# Patient Record
Sex: FEMALE | Race: WHITE | NOT HISPANIC OR LATINO | Employment: FULL TIME | URBAN - METROPOLITAN AREA
[De-identification: names, ages, dates, MRNs, and addresses within clinical notes are randomized per-mention and may not be internally consistent; named-entity substitution may affect disease eponyms.]

---

## 2017-11-15 PROBLEM — C50.912 MALIGNANT NEOPLASM OF LEFT FEMALE BREAST: Status: ACTIVE | Noted: 2017-11-15

## 2018-09-18 PROBLEM — G63 NEUROPATHY ASSOCIATED WITH MALIGNANT NEOPLASM: Status: ACTIVE | Noted: 2018-09-18

## 2018-09-18 PROBLEM — C80.1 NEUROPATHY ASSOCIATED WITH MALIGNANT NEOPLASM: Status: ACTIVE | Noted: 2018-09-18

## 2019-04-24 ENCOUNTER — OFFICE VISIT (OUTPATIENT)
Dept: URGENT CARE | Facility: CLINIC | Age: 54
End: 2019-04-24
Payer: MEDICAID

## 2019-04-24 VITALS
DIASTOLIC BLOOD PRESSURE: 91 MMHG | HEART RATE: 82 BPM | TEMPERATURE: 99 F | BODY MASS INDEX: 29.62 KG/M2 | WEIGHT: 200 LBS | HEIGHT: 69 IN | OXYGEN SATURATION: 100 % | SYSTOLIC BLOOD PRESSURE: 163 MMHG

## 2019-04-24 DIAGNOSIS — I80.01 SUPERFICIAL PHLEBITIS OF RIGHT LEG: Primary | ICD-10-CM

## 2019-04-24 PROCEDURE — 99213 PR OFFICE/OUTPT VISIT, EST, LEVL III, 20-29 MIN: ICD-10-PCS | Mod: S$GLB,,, | Performed by: FAMILY MEDICINE

## 2019-04-24 PROCEDURE — 99213 OFFICE O/P EST LOW 20 MIN: CPT | Mod: S$GLB,,, | Performed by: FAMILY MEDICINE

## 2019-04-24 NOTE — PATIENT INSTRUCTIONS
Superficial Thrombophlebitis   The superficial veins are the veins near the surface of the skin. Superficial thrombophlebitis is a problem that occurs when one or more of these veins become inflamed (red, irritated, and swollen). This is most often because of a blood clot.  Causes  The problem may occur after injury to a vein. It may also occur after having an intravenous (IV) line placed. Other factors that can make the problem more likely include:  · Varicose veins  · Venous insufficiency  · Bleeding disorders  · Prolonged periods of rest and not moving around  · IV drug abuse  Symptoms  Symptoms may appear in the affected area. They can include:  · Pain  · Tenderness  · Redness  · Warmth  · Swelling  · Hardening of the vein  In most cases, superficial thrombophlebitis resolves on its own with no problems. Treatment is focused on relieving symptoms.  Sometimes, there is a risk that the deep veins in the body may also be involved. This can lead to more serious problems. In such cases, further testing and treatments may be needed. Your healthcare provider can tell you more about this.  Home Care  To help relieve pain and swelling, you may be told to:  · Apply heat or cold to the affected area. Do this for up to 10 minutes as often as directed.  ¨ Heat: Use a warm compress, such as a heating pad.  ¨ Cold: Use a cold compress, such as a cold pack or bag of ice wrapped in a thin towel.  · Take nonsteroidal anti-inflammatory drugs (NSAIDS), such as ibuprofen. In some cases, other pain medicines may be prescribed.  · Keep the affected limb (arm or leg) raised above heart level as directed.  · Wear elastic compression stockings or bandages as directed.  · Avoid prolonged sitting or standing. Get up and walk often.  To help treat a blood clot, a blood thinner (anticoagulant) may be prescribed. If this is needed, be sure to take the medicine exactly as directed.  Follow-up care  Follow up with your healthcare provider as  advised. If imaging tests are done, they will be reviewed by a doctor. Youll be told the results and any new findings that may affect your treatment.  When to seek medical advice  Call your healthcare provider right away if any of these occur:  · Fever of 100.4°F (38ºC) or higher, or as directed by your provider  · Increasing pain, swelling, or tenderness in the affected area  · Spreading warmth or redness in the affected area  Call 911  Call 911 right away if any of these occur:  · Trouble breathing  · Chest pain or discomfort that worsens with deep breathing or coughing  · Coughing (may cough up blood)  · Fast or irregular heartbeat  · Sweating  · Anxiety  · Lightheadedness, dizziness, or fainting  · Extreme confusion  · Extreme drowsiness or trouble waking up  · New pain in the chest, arm, shoulder, neck, or upper back  Date Last Reviewed: 9/21/2015  © 6400-0746 [employer]. [address] [place] [address], [place] [address]. All rights reserved. This information is not intended as a substitute for professional medical care. Always follow your healthcare professional's instructions.

## 2019-04-24 NOTE — PROGRESS NOTES
"Subjective:       Patient ID: Krystyna Miller is a 54 y.o. female.    Vitals:  height is 5' 9" (1.753 m) and weight is 90.7 kg (200 lb). Her oral temperature is 99 °F (37.2 °C). Her blood pressure is 163/91 (abnormal) and her pulse is 82. Her oxygen saturation is 100%.     Chief Complaint: Leg Swelling    Krystyna hit her right leg on one of the machines at her gym last friday. Since then its tender to the touch and has been very red.    Leg Pain    The incident occurred more than 1 week ago. The incident occurred at the gym. The injury mechanism was a direct blow. The pain is present in the right leg. The pain is at a severity of 2/10. The pain is mild. It is unknown if a foreign body is present. Nothing aggravates the symptoms. She has tried acetaminophen for the symptoms. The treatment provided mild relief.       Constitution: Negative for fatigue.   HENT: Negative for facial swelling and facial trauma.    Neck: Negative for neck stiffness.   Cardiovascular: Negative for chest trauma.   Eyes: Negative for eye trauma, double vision and blurred vision.   Gastrointestinal: Negative for abdominal trauma, abdominal pain and rectal bleeding.   Genitourinary: Negative for hematuria, missed menses, genital trauma and pelvic pain.   Musculoskeletal: Positive for pain and joint swelling. Negative for trauma and abnormal ROM of joint.   Skin: Negative for color change, wound, abrasion, laceration and bruising.   Neurological: Negative for dizziness, history of vertigo, light-headedness, coordination disturbances, altered mental status and loss of consciousness.   Hematologic/Lymphatic: Negative for history of bleeding disorder.   Psychiatric/Behavioral: Negative for altered mental status.       Objective:      Physical Exam   Constitutional: She is oriented to person, place, and time. She appears well-developed and well-nourished. She is cooperative.  Non-toxic appearance. She does not appear ill. No distress.   HENT:   Head: " Normocephalic and atraumatic.   Right Ear: Hearing, tympanic membrane, external ear and ear canal normal.   Left Ear: Hearing, tympanic membrane, external ear and ear canal normal.   Nose: Nose normal. No mucosal edema, rhinorrhea or nasal deformity. No epistaxis. Right sinus exhibits no maxillary sinus tenderness and no frontal sinus tenderness. Left sinus exhibits no maxillary sinus tenderness and no frontal sinus tenderness.   Mouth/Throat: Uvula is midline, oropharynx is clear and moist and mucous membranes are normal. No trismus in the jaw. Normal dentition. No uvula swelling. No posterior oropharyngeal erythema.   Eyes: Conjunctivae and lids are normal. Right eye exhibits no discharge. Left eye exhibits no discharge. No scleral icterus.   Sclera clear bilat   Neck: Trachea normal, normal range of motion, full passive range of motion without pain and phonation normal. Neck supple.   Cardiovascular: Normal rate, regular rhythm, normal heart sounds, intact distal pulses and normal pulses.   Pulmonary/Chest: Effort normal and breath sounds normal. No respiratory distress.   Abdominal: Soft. Normal appearance and bowel sounds are normal. She exhibits no distension, no pulsatile midline mass and no mass. There is no tenderness.   Musculoskeletal: Normal range of motion. She exhibits no edema or deformity.   Neurological: She is alert and oriented to person, place, and time. She exhibits normal muscle tone. Coordination normal.   Skin: Skin is warm, dry and intact. She is not diaphoretic. No pallor.   Varicosities of the anterior shins.  Just above the right ankle is an area of super facial thrombophlebitis with mild surrounding erythema.    This is lesion patient is referring to there is no evidence of secondary infection or fluctuance.   Psychiatric: She has a normal mood and affect. Her speech is normal and behavior is normal. Judgment and thought content normal. Cognition and memory are normal.   Nursing note and  vitals reviewed.      Assessment:       1. Superficial phlebitis of right leg        Plan:         Superficial phlebitis of right leg     Elevation moist heat.  No indication for antibiotics or I and D at this time.  Literature provided.  Follow-up if no improvement a 3-5 days.

## 2019-04-27 ENCOUNTER — TELEPHONE (OUTPATIENT)
Dept: URGENT CARE | Facility: CLINIC | Age: 54
End: 2019-04-27

## 2020-04-22 DIAGNOSIS — M25.561 ACUTE PAIN OF RIGHT KNEE: Primary | ICD-10-CM

## 2020-04-27 ENCOUNTER — OFFICE VISIT (OUTPATIENT)
Dept: ORTHOPEDICS | Facility: CLINIC | Age: 55
End: 2020-04-27
Payer: MEDICAID

## 2020-04-27 ENCOUNTER — HOSPITAL ENCOUNTER (OUTPATIENT)
Dept: RADIOLOGY | Facility: HOSPITAL | Age: 55
Discharge: HOME | End: 2020-04-27
Attending: ORTHOPAEDIC SURGERY
Payer: MEDICAID

## 2020-04-27 VITALS — TEMPERATURE: 98 F | WEIGHT: 220 LBS | BODY MASS INDEX: 32.58 KG/M2 | HEIGHT: 69 IN

## 2020-04-27 DIAGNOSIS — M25.561 BILATERAL KNEE PAIN: ICD-10-CM

## 2020-04-27 DIAGNOSIS — M17.11 PRIMARY OSTEOARTHRITIS OF RIGHT KNEE: Primary | ICD-10-CM

## 2020-04-27 DIAGNOSIS — M25.561 ACUTE PAIN OF RIGHT KNEE: ICD-10-CM

## 2020-04-27 DIAGNOSIS — M25.562 BILATERAL KNEE PAIN: ICD-10-CM

## 2020-04-27 PROCEDURE — 99999 PR PBB SHADOW E&M-EST. PATIENT-LVL III: ICD-10-PCS | Mod: PBBFAC,,, | Performed by: ORTHOPAEDIC SURGERY

## 2020-04-27 PROCEDURE — 99999 PR PBB SHADOW E&M-EST. PATIENT-LVL III: CPT | Mod: PBBFAC,,, | Performed by: ORTHOPAEDIC SURGERY

## 2020-04-27 PROCEDURE — 73562 X-RAY EXAM OF KNEE 3: CPT | Mod: 26,50,, | Performed by: RADIOLOGY

## 2020-04-27 PROCEDURE — 99203 OFFICE O/P NEW LOW 30 MIN: CPT | Mod: 25,S$PBB,, | Performed by: ORTHOPAEDIC SURGERY

## 2020-04-27 PROCEDURE — 99213 OFFICE O/P EST LOW 20 MIN: CPT | Mod: PBBFAC,25,PN | Performed by: ORTHOPAEDIC SURGERY

## 2020-04-27 PROCEDURE — 97760 ORTHOTIC MGMT&TRAING 1ST ENC: CPT | Mod: GP,,, | Performed by: ORTHOPAEDIC SURGERY

## 2020-04-27 PROCEDURE — 73562 XR KNEE ORTHO BILAT: ICD-10-PCS | Mod: 26,50,, | Performed by: RADIOLOGY

## 2020-04-27 PROCEDURE — 99203 PR OFFICE/OUTPT VISIT, NEW, LEVL III, 30-44 MIN: ICD-10-PCS | Mod: 25,S$PBB,, | Performed by: ORTHOPAEDIC SURGERY

## 2020-04-27 PROCEDURE — 97760 PR ORTHOTIC MGMT&TRAINJ INITIAL ENC EA 15 MINS: ICD-10-PCS | Mod: GP,,, | Performed by: ORTHOPAEDIC SURGERY

## 2020-04-27 PROCEDURE — 73562 X-RAY EXAM OF KNEE 3: CPT | Mod: TC,50,PO

## 2020-04-27 NOTE — PROGRESS NOTES
55 years old seen here today for 2nd opinion for right greater than left knee pain.  She has had this now for at least 2 years.  She feels that it was made worse after receiving chemotherapy for breast cancer.  She was diagnosed with breast cancer in October of 2017.  She reports to have had knee injections in the past which only provided relief for just a few days and did leave her with a flushed feeling for a few days      Exam shows valgus deformity, tenderness to joint line without signs infection or instability    X-rays show arthritic changes with valgus knee    Assessment:  Knee arthrosis    Plan:  Knee brace, physical therapy, follow-up as needed    We performed a custom orthotic/brace fitting, adjusting and training with the patient. The patient demonstrated understanding and proper care. This was performed for 15 minutes.    Further History  Aching pain  Worse with activity  Relieved with rest  No other associated symptoms  No other radiation    Further Exam  Alert and oriented  Pleasant  Contralateral limb has appropriate range of motion for age and condition  Contralateral limb has appropriate strength for age and condition  Contralateral limb has appropriate stability  for age and condition  No adenopathy  Pulses are appropriate for current condition  Skin is intact        Chief Complaint    Chief Complaint   Patient presents with    Right Knee - Pain       HPI  Krystyna Miller is a 55 y.o.  female who presents with       Past Medical History  Past Medical History:   Diagnosis Date    Breast cancer     Cancer     Fibroids     uterine    Hypertension     Osteoarthritis     Phlebitis        Past Surgical History  Past Surgical History:   Procedure Laterality Date    BREAST SURGERY      HAND SURGERY      tumor removal    MASTECTOMY, RADICAL Left 12/2017    PORTACATH PLACEMENT      VEIN SURGERY Right        Medications  Current Outpatient Medications   Medication Sig    gabapentin (NEURONTIN) 100 MG  capsule Take 1 capsule (100 mg total) by mouth every evening.    hydroCHLOROthiazide (HYDRODIURIL) 25 MG tablet Take 1 tablet (25 mg total) by mouth once daily.    ipratropium-albuterol (COMBIVENT)  mcg/actuation inhaler Inhale 1 puff into the lungs every 6 (six) hours as needed for Wheezing. Rescue    loratadine (CLARITIN) 10 mg tablet Take 1 tablet (10 mg total) by mouth once daily.    multivitamin (THERAGRAN) per tablet Take 1 tablet by mouth.    celecoxib (CELEBREX) 100 MG capsule Take 1 capsule (100 mg total) by mouth once daily. (Patient not taking: Reported on 4/27/2020)    tamoxifen (NOLVADEX) 20 MG Tab Take 1 tablet (20 mg total) by mouth once daily. (Patient not taking: Reported on 4/27/2020.)     No current facility-administered medications for this visit.        Allergies  Review of patient's allergies indicates:  No Known Allergies    Family History  Family History   Problem Relation Age of Onset    Heart disease Mother         CFH    COPD Mother     Osteoporosis Mother     Heart disease Father         CHF    Glaucoma Father     COPD Father     Cancer Brother         liver cancer       Social History  Social History     Socioeconomic History    Marital status: Single     Spouse name: Not on file    Number of children: Not on file    Years of education: Not on file    Highest education level: Not on file   Occupational History    Not on file   Social Needs    Financial resource strain: Not on file    Food insecurity:     Worry: Not on file     Inability: Not on file    Transportation needs:     Medical: Not on file     Non-medical: Not on file   Tobacco Use    Smoking status: Never Smoker    Smokeless tobacco: Never Used   Substance and Sexual Activity    Alcohol use: Yes     Alcohol/week: 0.0 standard drinks     Comment: Rarely: Wine, Adore    Drug use: No    Sexual activity: Not Currently   Lifestyle    Physical activity:     Days per week: Not on file     Minutes  per session: Not on file    Stress: Not on file   Relationships    Social connections:     Talks on phone: Not on file     Gets together: Not on file     Attends Yazidism service: Not on file     Active member of club or organization: Not on file     Attends meetings of clubs or organizations: Not on file     Relationship status: Not on file   Other Topics Concern    Not on file   Social History Narrative    Not on file               Review of Systems     Constitutional: Negative    HENT: Negative  Eyes: Negative  Respiratory: Negative  Cardiovascular: Negative  Musculoskeletal: HPI  Skin: Negative  Neurological: Negative  Hematological: Negative  Endocrine: Negative                 Physical Exam    Vitals:    04/27/20 0950   Temp: 98.3 °F (36.8 °C)     Body mass index is 32.49 kg/m².  Physical Examination:     General appearance -  well appearing, and in no distress  Mental status - awake  Neck - supple  Chest -  symmetric air entry  Heart - normal rate   Abdomen - soft      Assessment     1. Primary osteoarthritis of right knee          Plan

## 2020-04-30 ENCOUNTER — TELEPHONE (OUTPATIENT)
Dept: REHABILITATION | Facility: HOSPITAL | Age: 55
End: 2020-04-30

## 2020-04-30 PROBLEM — M25.562 KNEE PAIN, BILATERAL: Status: ACTIVE | Noted: 2020-04-30

## 2020-04-30 PROBLEM — M25.561 KNEE PAIN, BILATERAL: Status: ACTIVE | Noted: 2020-04-30

## 2020-05-07 ENCOUNTER — CLINICAL SUPPORT (OUTPATIENT)
Dept: REHABILITATION | Facility: HOSPITAL | Age: 55
End: 2020-05-07
Attending: ORTHOPAEDIC SURGERY
Payer: MEDICAID

## 2020-05-07 DIAGNOSIS — M25.661 DECREASED RANGE OF MOTION OF BOTH KNEES: ICD-10-CM

## 2020-05-07 DIAGNOSIS — R29.898 DECREASED STRENGTH OF LOWER EXTREMITY: ICD-10-CM

## 2020-05-07 DIAGNOSIS — M25.662 DECREASED RANGE OF MOTION OF BOTH KNEES: ICD-10-CM

## 2020-05-07 DIAGNOSIS — M17.11 PRIMARY OSTEOARTHRITIS OF RIGHT KNEE: ICD-10-CM

## 2020-05-07 DIAGNOSIS — R26.89 DECREASED FUNCTIONAL MOBILITY: ICD-10-CM

## 2020-05-07 PROCEDURE — 97110 THERAPEUTIC EXERCISES: CPT | Mod: PN

## 2020-05-07 PROCEDURE — 97161 PT EVAL LOW COMPLEX 20 MIN: CPT | Mod: PN

## 2020-05-07 NOTE — PLAN OF CARE
[facility]  Physical Therapy Initial Evaluation    Name: Krystyna Miller  Clinic Number: [id number]    Therapy Diagnosis:   Encounter Diagnoses   Name Primary?    Primary osteoarthritis of right knee     Decreased range of motion of both knees     Decreased strength of lower extremity     Decreased functional mobility      Physician: Leopoldo Salvador MD    Physician Orders: PT Eval and Treat   Medical Diagnosis from Referral: M17.11 (ICD-10-CM) - Primary osteoarthritis of right knee   Evaluation Date: 5/7/2020  Authorization Period Expiration: 04/27/2020  Plan of Care Expiration: 07/07/2020  Visit # / Visits authorized: 1/ 1    Time In: 3:15 PM  Time Out: 4:15 PM  Total Billable Time: 60 minutes    Precautions: Standard and cancer    Subjective   Date of onset: Chronic, MD appt 04/27 for worsening of symptoms  History of current condition - Krystyna presents to [facility] with complaints of B knee pain secondary to OA. Pt states she additionally has a pinched nerve in her neck and back. Pt reports her knee pain is chronic, but noticed increasing pain after she finished chemotherapy for breast cancer. Pt states she currently wears a brace while working but it increases swelling in the RLE. Pt cleans houses one per day, and is unable to kneel, squat, or ambulate stairs. Pt states her pain is worse in the morning and is unable to walk for the first 15 minutes upon waking. Pt states she will experience joint sounds in both knees, and sometimes buckling with walking. R knee pain is worse than L. Pt states she was exercising 3x per week prior to chemo treatment and exacerbation of pain.      Medical History:   Past Medical History:   Diagnosis Date    Breast cancer     Cancer     Fibroids     uterine    Hypertension     Osteoarthritis     Phlebitis        Surgical History:   Krystyna Miller  has a past surgical history that includes Hand surgery; Vein Surgery (Right); Breast surgery;  "Mastectomy, radical (Left, 12/2017); and Portacath placement.    Medications:   Krystyna has a current medication list which includes the following prescription(s): celecoxib, gabapentin, hydrochlorothiazide, ipratropium-albuterol, loratadine, multivitamin, and tamoxifen.    Allergies:   Review of patient's allergies indicates:  No Known Allergies     Imaging, x-ray: "Bilateral knee degenerative changes, worst and moderate in the right lateral compartment."    Prior Therapy: No prior therapy  Social History: Lives in 1st floor apartment, lives alone   Occupation: Cleans 4 houses   Prior Level of Function: Prior to incident, pt independent in all ADLs and physical activity , exercises 3x per week   Current Level of Function: Unable to exercise, unable to go up and down stairs, difficulty with prolonged walking, standing     Pain:  Current 0/10, worst 10/10, best 0/10   Location: bilateral knee   Description: Aching, Throbbing and Sharp  Aggravating Factors: Standing, Bending, Walking, Night Time and Morning  Easing Factors: ice    Pts goals: Be able to walk without any pain, go on walks, try to exercise     Objective     Posture Alignment : In standing, pt with forward head, rounded shoulder, increased genu valgum L>R pronation of L foot.     Movement Analysis: Pt required >1 attempt for sit to stand with use of UE support. Pt negotiated stairs using step to pattern, heavy reliance on UE support for handrails, and decreased weight bearing through RLE.     GAIT DEVIATIONS: Krystyna displays decreased giancarlo, decreased heel strike    ROM:    PROM Right Left Comment   Knee Extension: 0-2 degrees 3-0 degrees    Knee Flexion: 110 degrees 115 degrees    *pain    Strength:     Right Left Comment   Hip Flexion: 5/5 5/5    Hip ABD: 4+/5 4+/5    Hip Extension: 3+/5 3/5    Knee Ext: 4/5 4+/5    Knee Flex: 5/5 5/5    Ankle DF: 5/5 BLE    Impaired muscular endurance     Special Tests:  - Anterior Drawer:  negative  - Posterior Drawer:  " "negative  - Varus:  negative  - Valgus:  negative  - Gisels:  negative    Neurovascular:  - Sensation: grossly intact to light touch across extremities, neuropathy in B feet     - Balance:    - R SLS: NT   - L SLS: NT   - L Modified Tandem: 30 seconds, min sway    - R Modified Tandem: 30 seconds, min sway, reduced WB to RLE   - Feet together, eyes open: 30 seconds, no sway    - Feet together, eyes closed: 30 seconds, min sway    Palpation: Pt TTP a R>L tibiofemoral joint lines.     Joint Play:  Hypomobile R patellofemoral joint in superior and medial/lateral planes   Hypomobile R>L tibiofemoral joint in AP planes    CMS Impairment/Limitation/Restriction for FOTO Knee Survey    Therapist reviewed FOTO scores for Krystyna Miller on 5/7/2020.   FOTO documents entered into [employer] - see Media section.    Limitation Score: 65%         TREATMENT   Treatment Time In: 4:00   Treatment Time Out: 4:15   Total Treatment time separate from Evaluation: 15 minutes    Krystyna received therapeutic exercises to develop strength, endurance, ROM, flexibility and core stabilization for 15 minutes including:  Quad Sets x 10 each LE  Hooklying Add squeeze 10 x 5" hold  Hooklying Abd RTB x 10   Glut Sets x 10   Tailgating x 2 min     (next visit: recumbent bike, SAQ, clams, shuttle, hamstring stretch, pelvic tilt, heel slides/DKTC, patellar mobilization)    Home Exercises and Patient Education Provided    Education provided:   - Activity modification including squatting and kneeling   - PT evaluation findings     Written Home Exercises Provided: yes.  Exercises were reviewed and Krystyna was able to demonstrate them prior to the end of the session.  Krystyna demonstrated good  understanding of the education provided.     See EMR under Patient Instructions for exercises provided 5/7/2020.    Assessment   Krystyna is a 55 y.o. female referred to outpatient Physical Therapy with a medical diagnosis of M17.11 (ICD-10-CM) - Primary osteoarthritis of right knee. Pt " demonstrates decreased AROM of B knees, decreased joint mobility of B knees, decreased strength of BLE with reduced muscular endurance, impaired gait and decreased functional mobility. Pt is a good candidate for skilled therapy services at this time to restore joint mobility and AROM, improve LE strength, and increase stabilization of B knees; so pt may increase independence in community ambulation and functional mobility. Positive prognostic factors include motivation for PT services. Negative prognostic factors include chronicity and severity of symptoms.      Pt prognosis is Fair.   Pt will benefit from skilled outpatient Physical Therapy to address the deficits stated above and in the chart below, provide pt/family education, and to maximize pt's level of independence.     Plan of care discussed with patient: Yes  Pt's spiritual, cultural and educational needs considered and patient is agreeable to the plan of care and goals as stated below:     Anticipated Barriers for therapy: Work schedule    Medical Necessity is demonstrated by the following  History  Co-morbidities and personal factors that may impact the plan of care Co-morbidities:   history of cancer    Personal Factors:   no deficits     Moderate   Examination  Body Structures and Functions, activity limitations and participation restrictions that may impact the plan of care Body Regions:   lower extremities    Body Systems:    gross symmetry  ROM  strength  balance  gait    Participation Restrictions:   Pt unable to participate in physical activity or ambulate stairs    Activity limitations:   Learning and applying knowledge  no deficits    General Tasks and Commands  no deficits    Communication  no deficits    Mobility  walking    Self care  no deficits    Domestic Life  no deficits    Interactions/Relationships  no deficits    Life Areas  no deficits    Community and Social Life  no deficits         moderate   Clinical Presentation stable and  uncomplicated low   Decision Making/ Complexity Score: low     Goals:  Short Term Goals: 4 weeks   Report decreased B knee pain < / = 8 /10 at worst to increase tolerance for work related activity  Pt will be able to perform 10 mini squats without increase in B knee pain to improve ability to lift objects without straining back  Pt to report > / = 30% decrease in difficulty transitioning from sit to  order to improve functional mobility  Pt will improve R knee flexion AROM > / = to L in order to improve gait mechanics.   Pt to tolerate HEP to improve independence with ADL's    Long Term Goals: 8 weeks   Pt will increased MMT for B hip extension by 1 grade to increase tolerance for work activities  Pt will ascend/descend 4 steps with 1 rail, reciprocal pattern, mod I without increased pain.  Pt will demonstrate minimal to no gait deviations with ambulation in order to tolerate functional exercises.   Pt will be able to perform 30 minutes of physical activity 2x per week to increase tolerance for exercise.   Pt will be independent in HEP and symptom management    Plan   Plan of care Certification: 5/7/2020 to 07/07/2020.    Outpatient Physical Therapy 2 times weekly for 8 weeks to include the following interventions: Aquatic Therapy, Electrical Stimulation IFC/NMES, Gait Training, Manual Therapy, Moist Heat/ Ice, Neuromuscular Re-ed, Patient Education, Therapeutic Activites and Therapeutic Exercise.     Nancy Short, PT

## 2020-05-28 ENCOUNTER — CLINICAL SUPPORT (OUTPATIENT)
Dept: REHABILITATION | Facility: HOSPITAL | Age: 55
End: 2020-05-28
Payer: MEDICAID

## 2020-05-28 DIAGNOSIS — M25.661 DECREASED RANGE OF MOTION OF BOTH KNEES: ICD-10-CM

## 2020-05-28 DIAGNOSIS — R29.898 DECREASED STRENGTH OF LOWER EXTREMITY: ICD-10-CM

## 2020-05-28 DIAGNOSIS — G89.29 CHRONIC PAIN OF BOTH KNEES: ICD-10-CM

## 2020-05-28 DIAGNOSIS — M25.662 DECREASED RANGE OF MOTION OF BOTH KNEES: ICD-10-CM

## 2020-05-28 DIAGNOSIS — M25.562 CHRONIC PAIN OF BOTH KNEES: ICD-10-CM

## 2020-05-28 DIAGNOSIS — M25.561 CHRONIC PAIN OF BOTH KNEES: ICD-10-CM

## 2020-05-28 DIAGNOSIS — R26.89 DECREASED FUNCTIONAL MOBILITY: ICD-10-CM

## 2020-05-28 PROCEDURE — 97140 MANUAL THERAPY 1/> REGIONS: CPT | Mod: PN

## 2020-05-28 PROCEDURE — 97110 THERAPEUTIC EXERCISES: CPT | Mod: PN

## 2020-05-28 NOTE — PROGRESS NOTES
"  Physical Therapy Daily Treatment Note     Name: Krystyna Miller  Clinic Number: [id number]    Therapy Diagnosis:   Encounter Diagnoses   Name Primary?    Decreased range of motion of both knees     Decreased strength of lower extremity     Decreased functional mobility     Chronic pain of both knees      Physician: Leopoldo Salvador MD    Visit Date: 5/28/2020    Physician Orders: PT Eval and Treat   Medical Diagnosis from Referral: M17.11 (ICD-10-CM) - Primary osteoarthritis of right knee   Evaluation Date: 5/7/2020  Authorization Period Expiration: 06/25/2020  Plan of Care Expiration: 07/07/2020  Visit # / Visits authorized: 1/ 12 (1 prior)      Time In: 2:10 PM  Time Out: 2:55 PM  Total Billable Time: 35 minutes    Precautions: Standard and cancer    Subjective     Pt reports: she is very frustrated, because she is in so much pain. Pt states she feels like the exercises have made her pain worse in her R knee. Pt states she has been wearing her R knee brace, which makes her knee feel better. Pt reports the brace makes her ankle swell.   She was compliant with home exercise program.  Response to previous treatment: increase in symptoms  Functional change: No change     Pain: 0/10 with brace   Location: bilateral knee      Objective     Krystyna received therapeutic exercises to develop strength, endurance, ROM, flexibility, posture and core stabilization for 10 minutes including:  Quad Sets under half foam 10 x 5" hold   Glut Sets x 10   Heel Slides x 10     Krystyna received the following manual therapy techniques: Joint mobilizations and Soft tissue Mobilization were applied to the: R knee for 25 minutes, including:  R tibiofemoral AP glides Gr I - II   R patellofemoral glides all planes Gr I - II (considerable hypomobility noted)  STM to patellar borders, tendon, distal quadriceps, medial and lateral knee     Krystyna received cold pack for 10 minutes to B knees.      Home Exercises Provided and Patient Education Provided "     Education provided:   - compliance with HEP  - activity modification     Written Home Exercises Provided: Patient instructed to cont prior HEP.  Exercises were reviewed and Krystyna was able to demonstrate them prior to the end of the session.  Krystyna demonstrated good  understanding of the education provided.     See EMR under Patient Instructions for exercises provided prior visit.    Assessment     Pt demonstrated fair tolerance for treatment session today. Treatment session modified to accommodate for pt increase in symptoms. Pt demonstrated considerable hypomobility of R patellofemoral joint, limiting quad activation and R knee AROM. Pt with visible atrophy of R quadriceps. Pt demonstrated improvement of symptoms following treatment. Will attempt to progress as tolerated.     Krystyna is progressing well towards her goals.   Pt prognosis is Fair.     Pt will continue to benefit from skilled outpatient physical therapy to address the deficits listed in the problem list box on initial evaluation, provide pt/family education and to maximize pt's level of independence in the home and community environment.     Pt's spiritual, cultural and educational needs considered and pt agreeable to plan of care and goals.     Anticipated barriers to physical therapy: Work schedule    Goals:   Short Term Goals: 4 weeks (progressing, not met)  Report decreased B knee pain < / = 8 /10 at worst to increase tolerance for work related activity  Pt will be able to perform 10 mini squats without increase in B knee pain to improve ability to lift objects without straining back  Pt to report > / = 30% decrease in difficulty transitioning from sit to  order to improve functional mobility  Pt will improve R knee flexion AROM > / = to L in order to improve gait mechanics.   Pt to tolerate HEP to improve independence with ADL's     Long Term Goals: 8 weeks (progressing, not met)  Pt will increased MMT for B hip extension by 1 grade to  increase tolerance for work activities  Pt will ascend/descend 4 steps with 1 rail, reciprocal pattern, mod I without increased pain.  Pt will demonstrate minimal to no gait deviations with ambulation in order to tolerate functional exercises.   Pt will be able to perform 30 minutes of physical activity 2x per week to increase tolerance for exercise.   Pt will be independent in HEP and symptom management      Plan     Continue with POC towards goals.     Nancy Short, PT

## 2020-06-04 ENCOUNTER — CLINICAL SUPPORT (OUTPATIENT)
Dept: REHABILITATION | Facility: HOSPITAL | Age: 55
End: 2020-06-04
Payer: MEDICARE

## 2020-06-04 DIAGNOSIS — M25.662 DECREASED RANGE OF MOTION OF BOTH KNEES: ICD-10-CM

## 2020-06-04 DIAGNOSIS — G89.29 CHRONIC PAIN OF BOTH KNEES: ICD-10-CM

## 2020-06-04 DIAGNOSIS — M25.561 CHRONIC PAIN OF BOTH KNEES: ICD-10-CM

## 2020-06-04 DIAGNOSIS — M25.562 CHRONIC PAIN OF BOTH KNEES: ICD-10-CM

## 2020-06-04 DIAGNOSIS — R26.89 DECREASED FUNCTIONAL MOBILITY: ICD-10-CM

## 2020-06-04 DIAGNOSIS — M25.661 DECREASED RANGE OF MOTION OF BOTH KNEES: ICD-10-CM

## 2020-06-04 DIAGNOSIS — R29.898 DECREASED STRENGTH OF LOWER EXTREMITY: ICD-10-CM

## 2020-06-04 PROCEDURE — 97140 MANUAL THERAPY 1/> REGIONS: CPT | Mod: PN

## 2020-06-04 PROCEDURE — 97110 THERAPEUTIC EXERCISES: CPT | Mod: PN

## 2020-06-04 NOTE — PROGRESS NOTES
"  Physical Therapy Daily Treatment Note     Name: Krystyna Miller  Clinic Number: [id number]    Therapy Diagnosis:   Encounter Diagnoses   Name Primary?    Decreased range of motion of both knees     Decreased strength of lower extremity     Decreased functional mobility     Chronic pain of both knees      Physician: Leopoldo Salvador MD    Visit Date: 6/4/2020    Physician Orders: PT Eval and Treat   Medical Diagnosis from Referral: M17.11 (ICD-10-CM) - Primary osteoarthritis of right knee   Evaluation Date: 5/7/2020  Authorization Period Expiration: 06/25/2020  Plan of Care Expiration: 07/07/2020  Visit # / Visits authorized: 2/ 12 (1 prior)      Time In: 1:17 PM  Time Out: 2:05 PM  Total Billable Time: 32 minutes    Precautions: Standard and cancer    Subjective     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 Pt reports her L knee is more swollen today, because she has been on her feet all week. Pt states she took celebrex prior to treatment session today.   She was compliant with home exercise program.  Response to previous treatment: increase in symptoms  Functional change: No change     Pain: 0/10 with brace   Location: bilateral knee      Objective     Pt arrived to treatment session with brace donned to RLE and with antalgic gait.     Krystyna received therapeutic exercises to develop strength, endurance, ROM, flexibility, posture and core stabilization for 17 minutes including:  Quad Sets under half foam 10 x 5" hold   SAQ 6" roll x 10 each LE  Glut Sets x 10   Heel Slides x 10   Hooklying Abd RTB x 10     Krystyna received the following manual therapy techniques: Joint mobilizations and Soft tissue Mobilization were applied to the: R knee for 15 minutes, including:  B tibiofemoral AP glides Gr I - II   R patellofemoral glides all planes Gr I - II (considerable hypomobility noted)  STM to patellar borders, tendon, distal quadriceps, medial and lateral knee     Krystyna received cold pack for 10 minutes " to B knees.      Home Exercises Provided and Patient Education Provided     Education provided:   - compliance with HEP  - activity modification     Written Home Exercises Provided: Patient instructed to cont prior HEP.  Exercises were reviewed and Krystyna was able to demonstrate them prior to the end of the session.  Krystyna demonstrated good  understanding of the education provided.     See EMR under Patient Instructions for exercises provided prior visit.    Assessment     Pt demonstrated improved tolerance for treatment session today, continued hypomobility of R patella. Pt displayed improvements in medial to lateral gliding; however, considerable restrictions remain in inferior/superior planes. Pt able to tolerate addition of SAQ and hip strengthening today without onset of symptoms. Pt with clicking over R patella with initial few repositions of each quad exercise, which resolved with continued repetitions. Pt improved quad activation of RLE with isometric, but LLE limited in full recruitment today. Possibly due to localized swelling. Will continue to progress as tolerated.     Krystyna is progressing well towards her goals.   Pt prognosis is Fair.     Pt will continue to benefit from skilled outpatient physical therapy to address the deficits listed in the problem list box on initial evaluation, provide pt/family education and to maximize pt's level of independence in the home and community environment.     Pt's spiritual, cultural and educational needs considered and pt agreeable to plan of care and goals.     Anticipated barriers to physical therapy: Work schedule    Goals:   Short Term Goals: 4 weeks (progressing, not met)  Report decreased B knee pain < / = 8 /10 at worst to increase tolerance for work related activity  Pt will be able to perform 10 mini squats without increase in B knee pain to improve ability to lift objects without straining back  Pt to report > / = 30% decrease in difficulty transitioning from sit  to  order to improve functional mobility  Pt will improve R knee flexion AROM > / = to L in order to improve gait mechanics.   Pt to tolerate HEP to improve independence with ADL's     Long Term Goals: 8 weeks (progressing, not met)  Pt will increased MMT for B hip extension by 1 grade to increase tolerance for work activities  Pt will ascend/descend 4 steps with 1 rail, reciprocal pattern, mod I without increased pain.  Pt will demonstrate minimal to no gait deviations with ambulation in order to tolerate functional exercises.   Pt will be able to perform 30 minutes of physical activity 2x per week to increase tolerance for exercise.   Pt will be independent in HEP and symptom management      Plan     Continue with POC towards goals. Continue improving patellar mobility and quad activation    Nancy Short, PT

## 2020-06-18 ENCOUNTER — CLINICAL SUPPORT (OUTPATIENT)
Dept: REHABILITATION | Facility: HOSPITAL | Age: 55
End: 2020-06-18
Payer: MEDICARE

## 2020-06-18 DIAGNOSIS — M25.561 CHRONIC PAIN OF BOTH KNEES: ICD-10-CM

## 2020-06-18 DIAGNOSIS — R26.89 DECREASED FUNCTIONAL MOBILITY: ICD-10-CM

## 2020-06-18 DIAGNOSIS — R29.898 DECREASED STRENGTH OF LOWER EXTREMITY: ICD-10-CM

## 2020-06-18 DIAGNOSIS — M25.661 DECREASED RANGE OF MOTION OF BOTH KNEES: ICD-10-CM

## 2020-06-18 DIAGNOSIS — M25.662 DECREASED RANGE OF MOTION OF BOTH KNEES: ICD-10-CM

## 2020-06-18 DIAGNOSIS — G89.29 CHRONIC PAIN OF BOTH KNEES: ICD-10-CM

## 2020-06-18 DIAGNOSIS — M25.562 CHRONIC PAIN OF BOTH KNEES: ICD-10-CM

## 2020-06-18 PROCEDURE — 97110 THERAPEUTIC EXERCISES: CPT | Mod: PN

## 2020-06-18 NOTE — PROGRESS NOTES
Physical Therapy Daily Treatment Note     Name: Krystyna Miller  Clinic Number: [id number]    Therapy Diagnosis:   Encounter Diagnoses   Name Primary?    Decreased range of motion of both knees     Decreased strength of lower extremity     Decreased functional mobility     Chronic pain of both knees      Physician: Leopoldo Salvador MD    Visit Date: 6/18/2020    Physician Orders: PT Eval and Treat   Medical Diagnosis from Referral: M17.11 (ICD-10-CM) - Primary osteoarthritis of right knee   Evaluation Date: 5/7/2020  Authorization Period Expiration: 06/25/2020  Plan of Care Expiration: 07/07/2020  Visit # / Visits authorized: 3/ 12 (1 prior)      Time In: 3:35 PM  Time Out: 4:30 PM  Total Billable Time: 45 minutes    Precautions: Standard and cancer    Subjective     Pt reports: she is doing okay today, took a celebrex prior to coming to therapy. Pt states she is not wearing her brace, because she would have to take it off anyway. Pt reports she is trying to do her exercises more frequently at home.   She was compliant with home exercise program.  Response to previous treatment: increase in symptoms  Functional change: No change     Pain: 3/10 with brace   Location: bilateral knee      Objective       Posture Alignment : In standing, pt with forward head, rounded shoulder, increased genu valgum L>R pronation of L foot.      Movement Analysis: Pt requires additional time and effort to complete bed mobility. Pt negotiated stairs using step to pattern, heavy reliance on UE support for handrails, and decreased weight bearing through RLE. Pt utilizes B genu valgus to perform sit to stand transfer, and displays valgum from standing to sitting.      GAIT DEVIATIONS: Krystyna displays decreased giancarlo, decreased heel strike     ROM:     PROM Right Left Comment   Knee Extension: 0-2 degrees 3-0 degrees     Knee Flexion: 115 degrees 120 degrees     *pain     Strength:       Right Left Comment   Hip Flexion: 5/5 5/5     Hip ABD:  "4+/5 4+/5     Hip Extension: 3+/5 3/5     Knee Ext: 4/5 4+/5     Knee Flex: 5/5 5/5     Ankle DF: 5/5 BLE     Impaired muscular endurance      Special Tests:  - Anterior Drawer:  negative  - Posterior Drawer:  negative  - Varus:  negative  - Valgus:  negative  - Gisels:  negative     Neurovascular:  - Sensation: grossly intact to light touch across extremities, neuropathy in B feet      - Balance:               - R SLS: NT              - L SLS: NT              - L Modified Tandem: 30 seconds, min sway               - R Modified Tandem: 30 seconds, min sway, reduced WB to RLE              - Feet together, eyes open: 30 seconds, no sway               - Feet together, eyes closed: 30 seconds, min sway     Palpation: Pt TTP a R>L tibiofemoral joint lines.      Joint Play:  Hypomobile R patellofemoral joint in superior and medial/lateral planes   Hypomobile R>L tibiofemoral joint in AP planes      Krystyna received therapeutic exercises to develop strength, endurance, ROM, flexibility, posture and core stabilization for 30 minutes including:  Quad Sets under half foam 10 x 5" hold   SAQ 6" roll x 10 each LE  Glut Sets x 10   Heel Slides x 10   Hooklying Abd RTB 2 x 10   Hooklying Add Squeeze  x 15     Krystyna received the following manual therapy techniques: Joint mobilizations and Soft tissue Mobilization were applied to the: R knee for 15 minutes, including:  B tibiofemoral AP glides Gr I - II   R patellofemoral glides all planes Gr I - II (considerable hypomobility noted)  STM to patellar borders, tendon, distal quadriceps, medial and lateral knee     Krystyna received cold pack for 10 minutes to B knees.      Home Exercises Provided and Patient Education Provided     Education provided:   - compliance with HEP  - activity modification     Written Home Exercises Provided: Patient instructed to cont prior HEP.  Exercises were reviewed and Krystyna was able to demonstrate them prior to the end of the session.  Krystyna demonstrated good  " understanding of the education provided.     See EMR under Patient Instructions for exercises provided prior visit.    Assessment     Pt displayed improvements in B knee flexion and LE strength since beginning therapy services. Pt able to tolerate progression of therex today to include continued kinetic chain strengthening. Pt apprehensive to progress LE strengthening exercises, although would benefit from continued strengthening for improved mobility and stabilization to B knees. Pt educated on potential benefits of aquatic therapy until she is able to tolerate land based therapy; however, is not interested in participating at this time. Pt will continue to benefit from skilled PT services to address functional mobility deficits, impaired movement patterns, pain, and decreased strength; so she may maximize independence in ADLs and return to PLOF.     Krystyna is progressing well towards her goals.   Pt prognosis is Fair.     Pt will continue to benefit from skilled outpatient physical therapy to address the deficits listed in the problem list box on initial evaluation, provide pt/family education and to maximize pt's level of independence in the home and community environment.     Pt's spiritual, cultural and educational needs considered and pt agreeable to plan of care and goals.     Anticipated barriers to physical therapy: Work schedule    Goals:   Short Term Goals: 4 weeks (progressing, not met)  Report decreased B knee pain < / = 8 /10 at worst to increase tolerance for work related activity  Pt will be able to perform 10 mini squats without increase in B knee pain to improve ability to lift objects without straining back  Pt to report > / = 30% decrease in difficulty transitioning from sit to  order to improve functional mobility  Pt will improve R knee flexion AROM > / = to L in order to improve gait mechanics.   Pt to tolerate HEP to improve independence with ADL's     Long Term Goals: 8 weeks  (progressing, not met)  Pt will increased MMT for B hip extension by 1 grade to increase tolerance for work activities  Pt will ascend/descend 4 steps with 1 rail, reciprocal pattern, mod I without increased pain.  Pt will demonstrate minimal to no gait deviations with ambulation in order to tolerate functional exercises.   Pt will be able to perform 30 minutes of physical activity 2x per week to increase tolerance for exercise.   Pt will be independent in HEP and symptom management      Plan     Continue with POC towards goals. Continue improving patellar mobility and quad activation    Nancy Short, PT

## 2020-07-09 ENCOUNTER — CLINICAL SUPPORT (OUTPATIENT)
Dept: REHABILITATION | Facility: HOSPITAL | Age: 55
End: 2020-07-09
Payer: MEDICARE

## 2020-07-09 DIAGNOSIS — M25.561 CHRONIC PAIN OF BOTH KNEES: ICD-10-CM

## 2020-07-09 DIAGNOSIS — M25.661 DECREASED RANGE OF MOTION OF BOTH KNEES: ICD-10-CM

## 2020-07-09 DIAGNOSIS — M25.562 CHRONIC PAIN OF BOTH KNEES: ICD-10-CM

## 2020-07-09 DIAGNOSIS — G89.29 CHRONIC PAIN OF BOTH KNEES: ICD-10-CM

## 2020-07-09 DIAGNOSIS — R26.89 DECREASED FUNCTIONAL MOBILITY: ICD-10-CM

## 2020-07-09 DIAGNOSIS — R29.898 DECREASED STRENGTH OF LOWER EXTREMITY: ICD-10-CM

## 2020-07-09 DIAGNOSIS — M25.662 DECREASED RANGE OF MOTION OF BOTH KNEES: ICD-10-CM

## 2020-07-09 PROCEDURE — 97110 THERAPEUTIC EXERCISES: CPT | Mod: PN

## 2020-07-09 PROCEDURE — 97140 MANUAL THERAPY 1/> REGIONS: CPT | Mod: PN

## 2020-07-09 NOTE — PROGRESS NOTES
"  Physical Therapy Daily Treatment Note     Name: Krystyna Miller  Clinic Number: [id number]    Therapy Diagnosis:   Encounter Diagnoses   Name Primary?    Decreased range of motion of both knees     Decreased strength of lower extremity     Decreased functional mobility     Chronic pain of both knees      Physician: Leopoldo Salvador MD    Visit Date: 7/9/2020    Physician Orders: PT Eval and Treat   Medical Diagnosis from Referral: M17.11 (ICD-10-CM) - Primary osteoarthritis of right knee   Evaluation Date: 5/7/2020  Authorization Period Expiration: 06/25/2020  Plan of Care Expiration: 07/07/2020  Visit # / Visits authorized: 4/ 12 (1 prior)      Time In: 1:15 PM  Time Out: 2:10 PM  Total Billable Time: 45 minutes    Precautions: Standard and cancer    Subjective     Pt reports: she dropped something under her bed last night, and had to get on her hands and knees to retrieve the item. Pt states it did not hurt at the time, but is hurting today. Pt reports she continues to have difficulty with prolonged standing, walking, and sitting. Pt states this interferes with her work and [employer] activities. Pt states the brace does help.   She was compliant with home exercise program.  Response to previous treatment: increase in symptoms  Functional change: No change     Pain: 6/10 with brace Worst 7/10  Location: bilateral knee      Objective     Krystyna received therapeutic exercises to develop strength, endurance, ROM, flexibility, posture and core stabilization for 35 minutes including:  Quad Sets under half foam 20 x 5" hold   SAQ 6" roll 2 x 10 each LE  Tailgate x 2 min no weight   Standing Glut Sets 2 x 10   TKE RTB x 10 each LE  Hooklying Uni Abd RTB 2 x 10     Not Performed:   Hooklying Add Squeeze  x 15  Heel Slides x 10      Krystyna received the following manual therapy techniques: Joint mobilizations and Soft tissue Mobilization were applied to the: R knee for 10 minutes, including:  B tibiofemoral AP glides Gr I - II   R " patellofemoral glides all planes Gr I - II (considerable hypomobility noted)  STM to patellar borders, tendon, distal quadriceps, medial and lateral knee  Cupping to R peripatellar region with silicone cup     Krystyna received cold pack for 10 minutes to B knees.      Home Exercises Provided and Patient Education Provided     Education provided:   - compliance with HEP  - activity modification     Written Home Exercises Provided: Patient instructed to cont prior HEP.  Exercises were reviewed and Krystyna was able to demonstrate them prior to the end of the session.  Krystyna demonstrated good  understanding of the education provided.     See EMR under Patient Instructions for exercises provided prior visit.    Assessment     See Updated POC for assessment     Krystyna is progressing well towards her goals.   Pt prognosis is Fair.     Pt will continue to benefit from skilled outpatient physical therapy to address the deficits listed in the problem list box on initial evaluation, provide pt/family education and to maximize pt's level of independence in the home and community environment.     Pt's spiritual, cultural and educational needs considered and pt agreeable to plan of care and goals.     Anticipated barriers to physical therapy: Work schedule    Goals:   Short Term Goals: 4 weeks (progressing, not met)  Report decreased B knee pain < / = 8 /10 at worst to increase tolerance for work related activity MET  Pt will be able to perform 10 mini squats without increase in B knee pain to improve ability to lift objects without straining back  Pt to report > / = 30% decrease in difficulty transitioning from sit to  order to improve functional mobility  Pt will improve R knee flexion AROM > / = to L in order to improve gait mechanics.   Pt to tolerate HEP to improve independence with ADL's MET     Long Term Goals: 8 weeks (progressing, not met)  Pt will increased MMT for B hip extension by 1 grade to increase tolerance for work  activities  Pt will ascend/descend 4 steps with 1 rail, reciprocal pattern, mod I without increased pain.  Pt will demonstrate minimal to no gait deviations with ambulation in order to tolerate functional exercises.   Pt will be able to perform 30 minutes of physical activity 2x per week to increase tolerance for exercise.   Pt will be independent in HEP and symptom management      Plan     Continue with POC towards goals. Continue improving patellar mobility and quad activation    Nancy Short, PT

## 2020-07-09 NOTE — PLAN OF CARE
Outpatient Therapy Updated Plan of Care     Visit Date: 7/9/2020    Name: Krystyna Miller  Clinic Number: [id number]    Therapy Diagnosis:   Encounter Diagnoses   Name Primary?    Decreased range of motion of both knees     Decreased strength of lower extremity     Decreased functional mobility     Chronic pain of both knees      Physician: Leopoldo Salvador MD    Physician Orders: PT Eval and Treat   Medical Diagnosis from Referral: M17.11 (ICD-10-CM) - Primary osteoarthritis of right knee   Evaluation Date: 05/07/2020  Current Certification Period:  05/07/2020 to 07/07/2020  Authorization Period Expiration: 06/25/2020  Plan of Care Expiration: 09/09/2020  Visit # / Visits authorized: 4/12 (1 prior)    Precautions: Standard  Functional Level Prior to Evaluation:  Independent in ADLs and exercise    Subjective     Update:   Pt reports: she dropped something under her bed last night, and had to get on her hands and knees to retrieve the item. Pt states it did not hurt at the time, but is hurting today. Pt reports she continues to have difficulty with prolonged standing, walking, and sitting. Pt states this interferes with her work and [employer] activities. Pt states the brace does help.   She was compliant with home exercise program.  Response to previous treatment: increase in symptoms  Functional change: No change     Pain: 6/10 with brace Worst 7/10  Location: bilateral knee        Objective     Update:   Posture Alignment : In standing, pt with forward head, rounded shoulder, increased genu valgum L>R pronation of L foot.      Movement Analysis: Pt requires additional time and effort to complete bed mobility. Pt negotiated stairs using step to pattern, heavy reliance on UE support for handrails, and decreased weight bearing through RLE. Pt utilizes B genu valgus to perform sit to stand transfer, and displays valgum from standing to sitting.      GAIT DEVIATIONS: Krystyna displays decreased giancarlo, decreased heel strike      ROM:     PROM Right Left Comment   Knee Extension: 0-2 degrees 3-0 degrees     Knee Flexion: 115 degrees 120 degrees     *pain     Strength:       Right Left Comment   Hip Flexion: 5/5 5/5     Hip ABD: 4+/5 4+/5     Hip Extension: 3+/5 3/5     Knee Ext: 4/5 4+/5     Knee Flex: 5/5 5/5     Ankle DF: 5/5 BLE     Impaired muscular endurance      Special Tests:  - Anterior Drawer:  negative  - Posterior Drawer:  negative  - Varus:  negative  - Valgus:  negative  - Gisels:  negative     Neurovascular:  - Sensation: grossly intact to light touch across extremities, neuropathy in B feet      - Balance:               - R SLS: NT              - L SLS: NT              - L Modified Tandem: 30 seconds, min sway               - R Modified Tandem: 30 seconds, min sway, reduced WB to RLE              - Feet together, eyes open: 30 seconds, no sway               - Feet together, eyes closed: 30 seconds, min sway     Palpation: Pt TTP a R>L tibiofemoral joint lines.      Joint Play:  Hypomobile R patellofemoral joint in superior and medial/lateral planes   Hypomobile R>L tibiofemoral joint in AP planes      Assessment     Update: Pt demonstrates improvements in B knee flexion since beginning PT services. Pt additionally with improvements in R patellar mobility in medial and lateral planes. Pt able to tolerate progression of therex today to include closed chain strengthening exercises for more functional use of BLE. Pt limited in progression of treatment in PT due to increased pain, ability to attend PT once per week, and familial obligations that hinder pt from attending PT every week. Pt is compliant with HEP, but unable to progress as quickly due to sparse frequency of PT visits. Pt educated on importance of maintaining HEP at home to allow for progression in the clinic. Pt will continue to benefit from skilled PT services to increase strength of BLE kinetic chain for improved stabilization during functional mobility,  improved gait mechanics, and improved AROM of B knees; so she may maximize independence in ADLs and increase participation in recreational and social activity.     Goals:  Short Term Goals: 4 weeks   Report decreased B knee pain < / = 8 /10 at worst to increase tolerance for work related activity MET  Pt will be able to perform 10 mini squats without increase in B knee pain to improve ability to lift objects without straining back  Pt to report > / = 30% decrease in difficulty transitioning from sit to  order to improve functional mobility  Pt will improve R knee flexion AROM > / = to L in order to improve gait mechanics.   Pt to tolerate HEP to improve independence with ADL's MET     Long Term Goals: 8 weeks   Pt will increased MMT for B hip extension by 1 grade to increase tolerance for work activities  Pt will ascend/descend 4 steps with 1 rail, reciprocal pattern, mod I without increased pain.  Pt will demonstrate minimal to no gait deviations with ambulation in order to tolerate functional exercises.   Pt will be able to perform 30 minutes of physical activity 2x per week to increase tolerance for exercise.   Pt will be independent in HEP and symptom management      Previous Short Term Goals Status:   Pt has met 2 STGs  New Short Term Goals Status:   Progressing, unmet goals remain appropriate   Long Term Goal Status:   continue per initial plan of care.  Reasons for Recertification of Therapy:   to increase strength of BLE kinetic chain for improved stabilization during functional mobility, improved gait mechanics, and improved AROM of B knees; so she may maximize independence in ADLs and increase participation in recreational and social activity.     Plan     Updated Certification Period: 7/9/2020 to 09/09/2020  Recommended Treatment Plan: 2 times per week for 8 weeks: Aquatic Therapy, Electrical Stimulation IFC/NMES, Manual Therapy, Moist Heat/ Ice, Neuromuscular Re-ed, Patient Education, Therapeutic  Activites, Therapeutic Exercise and Ultrasound      Nancy Short, PT  7/9/2020

## 2020-08-06 ENCOUNTER — CLINICAL SUPPORT (OUTPATIENT)
Dept: REHABILITATION | Facility: HOSPITAL | Age: 55
End: 2020-08-06
Payer: MEDICARE

## 2020-08-06 DIAGNOSIS — M25.661 DECREASED RANGE OF MOTION OF BOTH KNEES: ICD-10-CM

## 2020-08-06 DIAGNOSIS — R26.89 DECREASED FUNCTIONAL MOBILITY: ICD-10-CM

## 2020-08-06 DIAGNOSIS — R29.898 DECREASED STRENGTH OF LOWER EXTREMITY: ICD-10-CM

## 2020-08-06 DIAGNOSIS — G89.29 CHRONIC PAIN OF BOTH KNEES: ICD-10-CM

## 2020-08-06 DIAGNOSIS — M25.662 DECREASED RANGE OF MOTION OF BOTH KNEES: ICD-10-CM

## 2020-08-06 DIAGNOSIS — M25.561 CHRONIC PAIN OF BOTH KNEES: ICD-10-CM

## 2020-08-06 DIAGNOSIS — M25.562 CHRONIC PAIN OF BOTH KNEES: ICD-10-CM

## 2020-08-06 PROCEDURE — 97110 THERAPEUTIC EXERCISES: CPT | Mod: PN

## 2020-08-06 PROCEDURE — 97140 MANUAL THERAPY 1/> REGIONS: CPT | Mod: PN

## 2020-08-06 NOTE — PROGRESS NOTES
"  Physical Therapy Daily Treatment Note     Name: Krystyna Miller  Clinic Number: [id number]    Therapy Diagnosis:   Encounter Diagnoses   Name Primary?    Decreased range of motion of both knees     Decreased strength of lower extremity     Decreased functional mobility     Chronic pain of both knees      Physician: Leopoldo Salvador MD    Visit Date: 8/6/2020    Physician Orders: PT Eval and Treat   Medical Diagnosis from Referral: M17.11 (ICD-10-CM) - Primary osteoarthritis of right knee   Evaluation Date: 5/7/2020  Authorization Period Expiration: 08/21/2020  Plan of Care Expiration: 09/07/2020  Visit # / Visits authorized: 1/7 (5 prior)      Time In: 3:30 PM  Time Out: 4:20 PM  Total Billable Time: 40 minutes    Precautions: Standard and cancer    Subjective     Pt reports: she is having minimal pain today. Pt states she feels like her L knee is straighter. Pt reports her knee was creaking and popping a lot yesterday, and then was able to walk on it straighter.   She was compliant with home exercise program.  Response to previous treatment: increase in symptoms  Functional change: No change     Pain: 1/10  Worst 7/10  Location: bilateral knee      Objective     Krystyna received therapeutic exercises to develop strength, endurance, ROM, flexibility, posture and core stabilization for 30 minutes including:  Quad Sets under half foam 20 x 5" hold   SAQ 8" roll 2 x 10 each LE  Tailgate x 2 min no weight   Standing Glut Sets 2 x 10   TKE RTB x 10 each LE  Recumbent Bike x 5 min Level 1    Not Performed:   Hooklying Uni Abd RTB 2 x 10   Hooklying Add Squeeze  x 15  Heel Slides x 10      Krystyna received the following manual therapy techniques: Joint mobilizations and Soft tissue Mobilization were applied to the: R knee for 10 minutes, including:  B tibiofemoral AP glides Gr I - II   R patellofemoral glides all planes Gr I - II (considerable hypomobility noted)  STM to patellar borders, tendon, distal quadriceps, medial and " lateral knee  (NP) Cupping to R peripatellar region with silicone cup     Krystyna received cold pack for 10 minutes to B knees.      Home Exercises Provided and Patient Education Provided     Education provided:   - compliance with HEP  - activity modification     Written Home Exercises Provided: Patient instructed to cont prior HEP.  Exercises were reviewed and Krystyna was able to demonstrate them prior to the end of the session.  Krystyna demonstrated good  understanding of the education provided.     See EMR under Patient Instructions for exercises provided prior visit.    Assessment     Pt demonstrated improved tolerance for treatment session today, able to perform recumbent bike today without onset of symptoms. Pt with improved quad contraction with isometric, and improved patellar mobility. Pt demonstrated increased patellar crepitus with RLE TKE today, and exercise was terminated. Pt only able to attend PT one time per week, and is making slow improvements in therapy.     Krystyna is progressing well towards her goals.   Pt prognosis is Fair.     Pt will continue to benefit from skilled outpatient physical therapy to address the deficits listed in the problem list box on initial evaluation, provide pt/family education and to maximize pt's level of independence in the home and community environment.     Pt's spiritual, cultural and educational needs considered and pt agreeable to plan of care and goals.     Anticipated barriers to physical therapy: Work schedule    Goals:   Short Term Goals: 4 weeks (progressing, not met)  Report decreased B knee pain < / = 8 /10 at worst to increase tolerance for work related activity MET  Pt will be able to perform 10 mini squats without increase in B knee pain to improve ability to lift objects without straining back  Pt to report > / = 30% decrease in difficulty transitioning from sit to  order to improve functional mobility  Pt will improve R knee flexion AROM > / = to L in order  to improve gait mechanics.   Pt to tolerate HEP to improve independence with ADL's MET     Long Term Goals: 8 weeks (progressing, not met)  Pt will increased MMT for B hip extension by 1 grade to increase tolerance for work activities  Pt will ascend/descend 4 steps with 1 rail, reciprocal pattern, mod I without increased pain.  Pt will demonstrate minimal to no gait deviations with ambulation in order to tolerate functional exercises.   Pt will be able to perform 30 minutes of physical activity 2x per week to increase tolerance for exercise.   Pt will be independent in HEP and symptom management      Plan     Continue with POC towards goals. Continue improving patellar mobility and quad activation    Nancy Short, PT

## 2020-08-17 ENCOUNTER — OFFICE VISIT (OUTPATIENT)
Dept: ORTHOPEDICS | Facility: CLINIC | Age: 55
End: 2020-08-17
Payer: MEDICARE

## 2020-08-17 VITALS — WEIGHT: 220 LBS | BODY MASS INDEX: 32.58 KG/M2 | HEIGHT: 69 IN

## 2020-08-17 DIAGNOSIS — M17.11 PRIMARY OSTEOARTHRITIS OF RIGHT KNEE: Primary | ICD-10-CM

## 2020-08-17 PROCEDURE — 99999 PR PBB SHADOW E&M-EST. PATIENT-LVL III: ICD-10-PCS | Mod: PBBFAC,,, | Performed by: ORTHOPAEDIC SURGERY

## 2020-08-17 PROCEDURE — 99999 PR PBB SHADOW E&M-EST. PATIENT-LVL III: CPT | Mod: PBBFAC,,, | Performed by: ORTHOPAEDIC SURGERY

## 2020-08-17 PROCEDURE — 3008F PR BODY MASS INDEX (BMI) DOCUMENTED: ICD-10-PCS | Mod: CPTII,S$GLB,, | Performed by: ORTHOPAEDIC SURGERY

## 2020-08-17 PROCEDURE — 3008F BODY MASS INDEX DOCD: CPT | Mod: CPTII,S$GLB,, | Performed by: ORTHOPAEDIC SURGERY

## 2020-08-17 PROCEDURE — 99211 OFF/OP EST MAY X REQ PHY/QHP: CPT | Mod: S$GLB,,, | Performed by: ORTHOPAEDIC SURGERY

## 2020-08-17 PROCEDURE — 99211 PR OFFICE/OUTPT VISIT, EST, LEVL I: ICD-10-PCS | Mod: S$GLB,,, | Performed by: ORTHOPAEDIC SURGERY

## 2020-08-17 NOTE — PROGRESS NOTES
55 years old follow-up of her knee.  She has seen improvement with therapy and bracing    X-rays show arthritic changes of the knee with valgus deformity    Plan continue with strengthening over time, follow-up as needed

## 2020-08-20 ENCOUNTER — CLINICAL SUPPORT (OUTPATIENT)
Dept: REHABILITATION | Facility: HOSPITAL | Age: 55
End: 2020-08-20
Payer: MEDICARE

## 2020-08-20 DIAGNOSIS — G89.29 CHRONIC PAIN OF BOTH KNEES: ICD-10-CM

## 2020-08-20 DIAGNOSIS — M25.562 CHRONIC PAIN OF BOTH KNEES: ICD-10-CM

## 2020-08-20 DIAGNOSIS — R29.898 DECREASED STRENGTH OF LOWER EXTREMITY: ICD-10-CM

## 2020-08-20 DIAGNOSIS — R26.89 DECREASED FUNCTIONAL MOBILITY: ICD-10-CM

## 2020-08-20 DIAGNOSIS — M25.561 CHRONIC PAIN OF BOTH KNEES: ICD-10-CM

## 2020-08-20 DIAGNOSIS — M25.661 DECREASED RANGE OF MOTION OF BOTH KNEES: ICD-10-CM

## 2020-08-20 DIAGNOSIS — M25.662 DECREASED RANGE OF MOTION OF BOTH KNEES: ICD-10-CM

## 2020-08-20 PROCEDURE — 97110 THERAPEUTIC EXERCISES: CPT | Mod: PN

## 2020-08-20 NOTE — PROGRESS NOTES
"  Physical Therapy Daily Treatment Note     Name: Krystyna Miller  Clinic Number: [id number]    Therapy Diagnosis:   Encounter Diagnoses   Name Primary?    Decreased range of motion of both knees     Decreased strength of lower extremity     Decreased functional mobility     Chronic pain of both knees      Physician: Leopoldo Salvador MD    Visit Date: 8/20/2020    Physician Orders: PT Eval and Treat   Medical Diagnosis from Referral: M17.11 (ICD-10-CM) - Primary osteoarthritis of right knee   Evaluation Date: 5/7/2020  Authorization Period Expiration: 08/21/2020  Plan of Care Expiration: 09/07/2020  Visit # / Visits authorized: 1/7 (5 prior)      Time In: 115 PM  Time Out: 2:10 PM  Total Billable Time: 45 minutes    Precautions: Standard and cancer    Subjective     Pt reports:   She was compliant with home exercise program.  Response to previous treatment: increase in symptoms  Functional change: No change     Pain: 1/10   Location: bilateral knee      Objective     Krystyna received therapeutic exercises to develop strength, endurance, ROM, flexibility, posture and core stabilization for 40  minutes including:  Quad Sets under half foam 20 x 5" hold   SAQ 8" roll 2 x 10 each LE  Tailgate x 2 min no weight   LAQ 4# x 10  Sit to stand 24 box RTB at knees x 10   Recumbent Bike x 5 min Level 1    Not Performed:   Hooklying Uni Abd RTB 2 x 10   Hooklying Add Squeeze  x 15  Heel Slides x 10      Krystyna received the following manual therapy techniques: Joint mobilizations and Soft tissue Mobilization were applied to the: R knee for 5 minutes, including:  B tibiofemoral AP glides Gr I - II   R patellofemoral glides all planes Gr I - II (considerable hypomobility noted)  (NP) STM to patellar borders, tendon, distal quadriceps, medial and lateral knee  (NP) Cupping to R peripatellar region with silicone cup     Krystyna received cold pack for 10 minutes to B knees.      Home Exercises Provided and Patient Education Provided     Education " provided:   - compliance with HEP  - activity modification     Written Home Exercises Provided: Patient instructed to cont prior HEP.  Exercises were reviewed and Krystyna was able to demonstrate them prior to the end of the session.  Krystyna demonstrated good  understanding of the education provided.     See EMR under Patient Instructions for exercises provided prior visit.    Assessment     Pt demonstrated improved patellofemoral mobility today to RLE. However, pt with continued crepitus of R patella with closed chain flexion and TKE. Pt able to tolerate progression of therex today to include progressive quad strengthening and closed chain kinetic chain strengthening. Pt with improved gait mechanics as well. Will continue to progress as tolerated.     Krystyna is progressing well towards her goals.   Pt prognosis is Fair.     Pt will continue to benefit from skilled outpatient physical therapy to address the deficits listed in the problem list box on initial evaluation, provide pt/family education and to maximize pt's level of independence in the home and community environment.     Pt's spiritual, cultural and educational needs considered and pt agreeable to plan of care and goals.     Anticipated barriers to physical therapy: Work schedule    Goals:   Short Term Goals: 4 weeks (progressing, not met)  Report decreased B knee pain < / = 8 /10 at worst to increase tolerance for work related activity MET  Pt will be able to perform 10 mini squats without increase in B knee pain to improve ability to lift objects without straining back  Pt to report > / = 30% decrease in difficulty transitioning from sit to  order to improve functional mobility  Pt will improve R knee flexion AROM > / = to L in order to improve gait mechanics.   Pt to tolerate HEP to improve independence with ADL's MET     Long Term Goals: 8 weeks (progressing, not met)  Pt will increased MMT for B hip extension by 1 grade to increase tolerance for work  activities  Pt will ascend/descend 4 steps with 1 rail, reciprocal pattern, mod I without increased pain.  Pt will demonstrate minimal to no gait deviations with ambulation in order to tolerate functional exercises.   Pt will be able to perform 30 minutes of physical activity 2x per week to increase tolerance for exercise.   Pt will be independent in HEP and symptom management      Plan     Continue with POC towards goals. Continue improving patellar mobility and quad activation    Nancy Short, PT

## 2020-08-27 ENCOUNTER — CLINICAL SUPPORT (OUTPATIENT)
Dept: REHABILITATION | Facility: HOSPITAL | Age: 55
End: 2020-08-27
Payer: MEDICARE

## 2020-08-27 DIAGNOSIS — R29.898 DECREASED STRENGTH OF LOWER EXTREMITY: ICD-10-CM

## 2020-08-27 DIAGNOSIS — M25.662 DECREASED RANGE OF MOTION OF BOTH KNEES: ICD-10-CM

## 2020-08-27 DIAGNOSIS — G89.29 CHRONIC PAIN OF BOTH KNEES: ICD-10-CM

## 2020-08-27 DIAGNOSIS — M25.561 CHRONIC PAIN OF BOTH KNEES: ICD-10-CM

## 2020-08-27 DIAGNOSIS — M25.562 CHRONIC PAIN OF BOTH KNEES: ICD-10-CM

## 2020-08-27 DIAGNOSIS — M25.661 DECREASED RANGE OF MOTION OF BOTH KNEES: ICD-10-CM

## 2020-08-27 DIAGNOSIS — R26.89 DECREASED FUNCTIONAL MOBILITY: ICD-10-CM

## 2020-08-27 PROCEDURE — 97110 THERAPEUTIC EXERCISES: CPT | Mod: PN

## 2020-08-27 NOTE — PROGRESS NOTES
"  Physical Therapy Daily Treatment Note     Name: Krystyna Miller  Clinic Number: [id number]    Therapy Diagnosis:   Encounter Diagnoses   Name Primary?    Decreased range of motion of both knees     Decreased strength of lower extremity     Decreased functional mobility     Chronic pain of both knees      Physician: Leopoldo Salvador MD    Visit Date: 8/27/2020    Physician Orders: PT Eval and Treat   Medical Diagnosis from Referral: M17.11 (ICD-10-CM) - Primary osteoarthritis of right knee   Evaluation Date: 5/7/2020  Authorization Period Expiration: 08/21/2020  Plan of Care Expiration: 09/07/2020  Visit # / Visits authorized: 2/7 (5 prior)      Time In: 115 PM  Time Out: 2:10 PM  Total Billable Time: 45 minutes    Precautions: Standard and cancer    Subjective     Pt reports: her knees are a little bothersome today because of the weather. Pt states her knees have been feeling good.   She was compliant with home exercise program.  Response to previous treatment: increase in symptoms  Functional change: No change     Pain: 1/10   Location: bilateral knee      Objective     AROM     RLE 0 - 117  LLE 0 - 120    Krystyna received therapeutic exercises to develop strength, endurance, ROM, flexibility, posture and core stabilization for 40  minutes including:  Quad Sets under half foam 20 x 5" hold   SAQ 6" roll 2# 2 x 10   Tailgate x 2 4#   LAQ 4# x 10  Sit to stand 24 box RTB at knees x 10   Recumbent Bike x 5 min Level 1  Stair climbing x 3 trials    Not Performed:   Hooklying Uni Abd RTB 2 x 10   Hooklying Add Squeeze  x 15  Heel Slides x 10      Krystyna received the following manual therapy techniques: Joint mobilizations and Soft tissue Mobilization were applied to the: R knee for 8 minutes, including:  B tibiofemoral AP glides Gr I - II   R patellofemoral glides all planes Gr I - II (considerable hypomobility noted)  (NP) STM to patellar borders, tendon, distal quadriceps, medial and lateral knee  (NP) Cupping to R " peripatellar region with silicone cup     Krystyna received cold pack for 10 minutes to B knees.      Home Exercises Provided and Patient Education Provided     Education provided:   - compliance with HEP  - activity modification     Written Home Exercises Provided: Patient instructed to cont prior HEP.  Exercises were reviewed and Krystyna was able to demonstrate them prior to the end of the session.  Krystyna demonstrated good  understanding of the education provided.     See EMR under Patient Instructions for exercises provided prior visit.    Assessment     Pt continues to demonstrated improvements in R patellofemoral mobility, although continues with limitations in superior plane. Pt able to perform reciprocal stair climbing today without onset of symptoms. Pt initially performed circumduction of BLE along with heavy UE support. Once made aware of compensation, pt able to perform stair climbing appropriately with min UE support. Pt demonstrated eccentric weakness during sit to stand exercise today, with difficulty controlling descent. Pt will continue to benefit from PT services to address functional movement abnormalities, joint hypomobility, and muscle weakness; so she may return to PLOF and maximize independence in ADLs. Will perform formal assessment next visit and determine discharged planning to independent HEP.     Krystyna is progressing well towards her goals.   Pt prognosis is Fair.     Pt will continue to benefit from skilled outpatient physical therapy to address the deficits listed in the problem list box on initial evaluation, provide pt/family education and to maximize pt's level of independence in the home and community environment.     Pt's spiritual, cultural and educational needs considered and pt agreeable to plan of care and goals.     Anticipated barriers to physical therapy: Work schedule    Goals:   Short Term Goals: 4 weeks (progressing, not met)  Report decreased B knee pain < / = 8 /10 at worst to  increase tolerance for work related activity MET  Pt will be able to perform 10 mini squats without increase in B knee pain to improve ability to lift objects without straining back  Pt to report > / = 30% decrease in difficulty transitioning from sit to  order to improve functional mobility  Pt will improve R knee flexion AROM > / = to L in order to improve gait mechanics.   Pt to tolerate HEP to improve independence with ADL's MET     Long Term Goals: 8 weeks (progressing, not met)  Pt will increased MMT for B hip extension by 1 grade to increase tolerance for work activities  Pt will ascend/descend 4 steps with 1 rail, reciprocal pattern, mod I without increased pain.  Pt will demonstrate minimal to no gait deviations with ambulation in order to tolerate functional exercises.   Pt will be able to perform 30 minutes of physical activity 2x per week to increase tolerance for exercise.   Pt will be independent in HEP and symptom management      Plan     Continue with POC towards goals. Continue improving patellar mobility and quad activation    Nancy Short, PT

## 2020-09-16 PROBLEM — E66.09 CLASS 1 OBESITY DUE TO EXCESS CALORIES WITH SERIOUS COMORBIDITY AND BODY MASS INDEX (BMI) OF 31.0 TO 31.9 IN ADULT: Status: ACTIVE | Noted: 2020-09-16

## 2020-10-16 PROBLEM — R73.01 IMPAIRED FASTING BLOOD SUGAR: Status: ACTIVE | Noted: 2020-10-16

## 2020-12-21 ENCOUNTER — PATIENT MESSAGE (OUTPATIENT)
Dept: OTHER | Facility: OTHER | Age: 55
End: 2020-12-21

## 2021-05-17 PROBLEM — J45.901 REACTIVE AIRWAY DISEASE WITH ACUTE EXACERBATION: Status: ACTIVE | Noted: 2021-05-17

## 2021-11-05 ENCOUNTER — PATIENT MESSAGE (OUTPATIENT)
Dept: ADMINISTRATIVE | Facility: OTHER | Age: 56
End: 2021-11-05
Payer: MEDICARE

## 2022-11-07 PROBLEM — J30.1 SEASONAL ALLERGIC RHINITIS DUE TO POLLEN: Status: ACTIVE | Noted: 2022-11-07

## 2022-11-21 ENCOUNTER — OFFICE VISIT (OUTPATIENT)
Dept: URGENT CARE | Facility: CLINIC | Age: 57
End: 2022-11-21
Payer: MEDICARE

## 2022-11-21 VITALS
BODY MASS INDEX: 32.58 KG/M2 | TEMPERATURE: 100 F | SYSTOLIC BLOOD PRESSURE: 152 MMHG | OXYGEN SATURATION: 96 % | RESPIRATION RATE: 16 BRPM | DIASTOLIC BLOOD PRESSURE: 102 MMHG | WEIGHT: 220 LBS | HEART RATE: 96 BPM | HEIGHT: 69 IN

## 2022-11-21 DIAGNOSIS — J02.9 SORE THROAT: ICD-10-CM

## 2022-11-21 DIAGNOSIS — H10.9 BACTERIAL CONJUNCTIVITIS: ICD-10-CM

## 2022-11-21 DIAGNOSIS — J06.9 VIRAL URI WITH COUGH: ICD-10-CM

## 2022-11-21 DIAGNOSIS — J32.9 SINUSITIS, UNSPECIFIED CHRONICITY, UNSPECIFIED LOCATION: Primary | ICD-10-CM

## 2022-11-21 LAB
CTP QC/QA: YES
POC MOLECULAR INFLUENZA A AGN: NEGATIVE
POC MOLECULAR INFLUENZA B AGN: NEGATIVE

## 2022-11-21 PROCEDURE — 1159F MED LIST DOCD IN RCRD: CPT | Mod: CPTII,S$GLB,, | Performed by: NURSE PRACTITIONER

## 2022-11-21 PROCEDURE — 99203 PR OFFICE/OUTPT VISIT, NEW, LEVL III, 30-44 MIN: ICD-10-PCS | Mod: S$GLB,,, | Performed by: NURSE PRACTITIONER

## 2022-11-21 PROCEDURE — 87502 POCT INFLUENZA A/B MOLECULAR: ICD-10-PCS | Mod: QW,S$GLB,, | Performed by: NURSE PRACTITIONER

## 2022-11-21 PROCEDURE — 3080F DIAST BP >= 90 MM HG: CPT | Mod: CPTII,S$GLB,, | Performed by: NURSE PRACTITIONER

## 2022-11-21 PROCEDURE — 1160F RVW MEDS BY RX/DR IN RCRD: CPT | Mod: CPTII,S$GLB,, | Performed by: NURSE PRACTITIONER

## 2022-11-21 PROCEDURE — 1159F PR MEDICATION LIST DOCUMENTED IN MEDICAL RECORD: ICD-10-PCS | Mod: CPTII,S$GLB,, | Performed by: NURSE PRACTITIONER

## 2022-11-21 PROCEDURE — 3008F PR BODY MASS INDEX (BMI) DOCUMENTED: ICD-10-PCS | Mod: CPTII,S$GLB,, | Performed by: NURSE PRACTITIONER

## 2022-11-21 PROCEDURE — 3077F SYST BP >= 140 MM HG: CPT | Mod: CPTII,S$GLB,, | Performed by: NURSE PRACTITIONER

## 2022-11-21 PROCEDURE — 3080F PR MOST RECENT DIASTOLIC BLOOD PRESSURE >= 90 MM HG: ICD-10-PCS | Mod: CPTII,S$GLB,, | Performed by: NURSE PRACTITIONER

## 2022-11-21 PROCEDURE — 99203 OFFICE O/P NEW LOW 30 MIN: CPT | Mod: S$GLB,,, | Performed by: NURSE PRACTITIONER

## 2022-11-21 PROCEDURE — 3008F BODY MASS INDEX DOCD: CPT | Mod: CPTII,S$GLB,, | Performed by: NURSE PRACTITIONER

## 2022-11-21 PROCEDURE — 87502 INFLUENZA DNA AMP PROBE: CPT | Mod: QW,S$GLB,, | Performed by: NURSE PRACTITIONER

## 2022-11-21 PROCEDURE — 3077F PR MOST RECENT SYSTOLIC BLOOD PRESSURE >= 140 MM HG: ICD-10-PCS | Mod: CPTII,S$GLB,, | Performed by: NURSE PRACTITIONER

## 2022-11-21 PROCEDURE — 1160F PR REVIEW ALL MEDS BY PRESCRIBER/CLIN PHARMACIST DOCUMENTED: ICD-10-PCS | Mod: CPTII,S$GLB,, | Performed by: NURSE PRACTITIONER

## 2022-11-21 RX ORDER — PREDNISONE 20 MG/1
20 TABLET ORAL DAILY
Qty: 3 TABLET | Refills: 0 | Status: SHIPPED | OUTPATIENT
Start: 2022-11-21 | End: 2022-11-24

## 2022-11-21 RX ORDER — PROMETHAZINE HYDROCHLORIDE AND DEXTROMETHORPHAN HYDROBROMIDE 6.25; 15 MG/5ML; MG/5ML
5 SYRUP ORAL EVERY 4 HOURS PRN
Qty: 118 ML | Refills: 0 | Status: SHIPPED | OUTPATIENT
Start: 2022-11-21 | End: 2022-12-01

## 2022-11-21 RX ORDER — POLYMYXIN B SULFATE AND TRIMETHOPRIM 1; 10000 MG/ML; [USP'U]/ML
1 SOLUTION OPHTHALMIC EVERY 4 HOURS
Qty: 4 ML | Refills: 0 | Status: SHIPPED | OUTPATIENT
Start: 2022-11-21 | End: 2022-12-01

## 2022-11-21 RX ORDER — GUAIFENESIN AND DEXTROMETHORPHAN HYDROBROMIDE 1200; 60 MG/1; MG/1
1 TABLET, EXTENDED RELEASE ORAL 2 TIMES DAILY PRN
Qty: 30 TABLET | Refills: 0 | Status: SHIPPED | OUTPATIENT
Start: 2022-11-21 | End: 2023-08-15

## 2022-11-21 NOTE — PROGRESS NOTES
"Subjective:       Patient ID: Krystyna Miller is a 57 y.o. female.    Vitals:  height is 5' 9" (1.753 m) and weight is 99.8 kg (220 lb). Her oral temperature is 99.8 °F (37.7 °C). Her blood pressure is 152/102 (abnormal) and her pulse is 96. Her respiration is 16 and oxygen saturation is 96%.     Chief Complaint: Sinus Problem    Patient presents today with sinus/chest congestion and sore throat that began about 3 days ago.  Patient has been taking Mucinex.      Provider note begins below:  Patient denies cp, sob, vomiting or abdominal pain. Awake and alert. Speaking in full sentences. Denies sick contacts.    Also, patient reports left eye itching, discharge and redness for 2 days. Denies eye injury, FB sensation or loss of vision. Wears disposable contacts.    Sinus Problem  This is a new problem. The current episode started in the past 7 days. Associated symptoms include congestion, coughing and sinus pressure. Pertinent negatives include no chills, diaphoresis, ear pain or sore throat. Past treatments include oral decongestants.     Constitution: Negative. Negative for chills, sweating and fatigue.   HENT:  Positive for congestion and sinus pressure. Negative for ear pain, facial swelling and sore throat.    Neck: Negative for painful lymph nodes.   Cardiovascular: Negative.  Negative for chest trauma, chest pain and sob on exertion.   Eyes: Negative.  Negative for eye itching and eye pain.   Respiratory:  Positive for cough. Negative for chest tightness and asthma.    Gastrointestinal: Negative.  Negative for nausea, vomiting and diarrhea.   Endocrine: negative. cold intolerance and excessive thirst.   Genitourinary: Negative.  Negative for dysuria, frequency, urgency and hematuria.   Musculoskeletal:  Negative for pain, trauma and joint pain.   Skin: Negative.  Negative for rash, wound and hives.   Allergic/Immunologic: Negative.  Negative for eczema, asthma, hives and itching.   Neurological: Negative.  Negative " for disorientation and altered mental status.   Hematologic/Lymphatic: Negative.  Negative for swollen lymph nodes.   Psychiatric/Behavioral: Negative.  Negative for altered mental status, disorientation and confusion.      Objective:      Physical Exam   Constitutional: She is oriented to person, place, and time. She appears well-developed. She is cooperative.  Non-toxic appearance. She does not appear ill. No distress.   HENT:   Head: Normocephalic and atraumatic.   Ears:   Right Ear: Hearing, tympanic membrane, external ear and ear canal normal. impacted cerumen  Left Ear: Hearing, tympanic membrane, external ear and ear canal normal. impacted cerumen  Nose: Mucosal edema and rhinorrhea present. No purulent discharge, nasal deformity or congestion. No epistaxis. Right sinus exhibits no maxillary sinus tenderness and no frontal sinus tenderness. Left sinus exhibits no maxillary sinus tenderness and no frontal sinus tenderness.   Mouth/Throat: Uvula is midline, oropharynx is clear and moist and mucous membranes are normal. No trismus in the jaw. Normal dentition. No uvula swelling. No oropharyngeal exudate, posterior oropharyngeal edema or posterior oropharyngeal erythema.   Eyes: Lids are normal. Lids are everted and swept, no foreign bodies found. Right eye exhibits no discharge and no hordeolum. No foreign body present in the right eye. Left eye exhibits discharge. Left eye exhibits no hordeolum. No foreign body present in the left eye. Left conjunctiva is injected. Left conjunctiva has no hemorrhage. No scleral icterus. Extraocular movement intact vision grossly intact gaze aligned appropriately   Neck: Trachea normal and phonation normal. Neck supple. No edema present. No erythema present. No neck rigidity present.   Cardiovascular: Normal rate, regular rhythm, normal heart sounds and normal pulses.   Pulmonary/Chest: Effort normal and breath sounds normal. No stridor. No respiratory distress. She has no  decreased breath sounds. She has no wheezes. She has no rhonchi. She has no rales. She exhibits no tenderness.   Abdominal: Normal appearance. She exhibits no distension. Soft. flat abdomen There is no abdominal tenderness. There is no rebound, no guarding, no left CVA tenderness and no right CVA tenderness. No hernia.   Musculoskeletal: Normal range of motion.         General: No deformity. Normal range of motion.      Cervical back: She exhibits no tenderness.   Lymphadenopathy:     She has no cervical adenopathy.   Neurological: no focal deficit. She is alert and oriented to person, place, and time. She exhibits normal muscle tone. Coordination normal.   Skin: Skin is warm, dry, intact, not diaphoretic and not pale.   Psychiatric: Her speech is normal and behavior is normal. Mood, judgment and thought content normal.   Nursing note and vitals reviewed.  The following results have been reviewed with the patient:  LABS-  Results for orders placed or performed in visit on 11/21/22   POCT Influenza A/B MOLECULAR   Result Value Ref Range    POC Molecular Influenza A Ag Negative Negative, Not Reported    POC Molecular Influenza B Ag Negative Negative, Not Reported     Acceptable Yes         IMAGING-  No results found.        Assessment:       1. Sinusitis, unspecified chronicity, unspecified location    2. Sore throat    3. Bacterial conjunctivitis    4. Viral URI with cough          Plan:       FOLLOWUP  Follow up if symptoms worsen or fail to improve, for PLEASE CONTACT PCP OR CONTACT THE EMERGENCY ROOM..     PATIENT INSTRUCTIONS  Patient Instructions   INSTRUCTIONS:  - Rest.  - Drink plenty of fluids.  - Take Tylenol and/or Ibuprofen as directed as needed for fever/pain.  Do not take more than the recommended dose.  - follow up with your PCP within the next 1-2 weeks as needed.  - You must understand that you have received an Urgent Care treatment only and that you may be released before all of your  medical problems are known or treated.   - You, the patient, will arrange for follow up care as instructed.   - If your condition worsens or fails to improve we recommend that you receive another evaluation at the ER immediately or contact your PCP to discuss your concerns.   - You can call [phone] or [phone] to help schedule an appointment with the appropriate provider.     -If you smoke cigarettes, it would be beneficial for you to stop.    OVER THE COUNTER RECOMMENDATIONS/SUGGESTIONS.     Make sure to stay well hydrated.     Use Nasal Saline to mechanically move any post nasal drip from your eustachian tube or from the back of your throat.     Use warm salt water gargles to ease your throat pain. Warm salt water gargles as needed for sore throat-  1/2 tsp salt to 1 cup warm water, gargle as desired.     Use an antihistamine such as Claritin, Zyrtec or Allegra to dry you out.      Use pseudoephedrine (behind the counter) to decongest. Pseudoephedrine  30 mg up to 240 mg /day. It can raise your blood pressure and give you palpitations.     Use mucinex (guaifenisin) to break up mucous up to 2400mg/day to loosen any mucous.   The mucinex DM pill has a cough suppressant that can be sedating. It can be used at night to stop the tickle at the back of your throat.  You can use Mucinex D (it has guaifenesin and a high dose of pseudoephedrine) in the mornings to help decongest.        Use Afrin in each nare for no longer than 3 days, as it is addictive. It can also dry out your mucous membranes and cause elevated blood pressure. This is especially useful if you are flying.     Use Flonase 1-2 sprays/nostril per day. It is a local acting steroid nasal spray, if you develop a bloody nose, stop using the medication immediately.     Sometimes Nyquil at night is beneficial to help you get some rest, however it is sedating and it does have an antihistamine, and tylenol.     Honey is a natural cough suppressant that  can be used.     Tylenol up to 4,000 mg a day is safe for short periods and can be used for body aches, pain, and fever. However in high doses and prolonged use it can cause liver irritation.     Ibuprofen is a non-steroidal anti-inflammatory that can be used for body aches, pain, and fever.However it can also cause stomach irritation if over used.         THANK YOU FOR ALLOWING ME TO PARTICIPATE IN YOUR HEALTHCARE,     Sean Medina, NP     Sinusitis, unspecified chronicity, unspecified location  -     predniSONE (DELTASONE) 20 MG tablet; Take 1 tablet (20 mg total) by mouth once daily. for 3 days  Dispense: 3 tablet; Refill: 0    Sore throat  -     POCT Influenza A/B MOLECULAR    Bacterial conjunctivitis  -     polymyxin B sulf-trimethoprim (POLYTRIM) 10,000 unit- 1 mg/mL Drop; Place 1 drop into the left eye every 4 (four) hours. for 10 days  Dispense: 4 mL; Refill: 0    Viral URI with cough  -     dextromethorphan-guaiFENesin (MUCINEX DM) 60-1,200 mg per 12 hr tablet; Take 1 tablet by mouth 2 (two) times daily as needed (cough/congestion).  Dispense: 30 tablet; Refill: 0  -     promethazine-dextromethorphan (PROMETHAZINE-DM) 6.25-15 mg/5 mL Syrp; Take 5 mLs by mouth every 4 (four) hours as needed (night cough).  Dispense: 118 mL; Refill: 0

## 2022-11-21 NOTE — PATIENT INSTRUCTIONS
INSTRUCTIONS:  - Rest.  - Drink plenty of fluids.  - Take Tylenol and/or Ibuprofen as directed as needed for fever/pain.  Do not take more than the recommended dose.  - follow up with your PCP within the next 1-2 weeks as needed.  - You must understand that you have received an Urgent Care treatment only and that you may be released before all of your medical problems are known or treated.   - You, the patient, will arrange for follow up care as instructed.   - If your condition worsens or fails to improve we recommend that you receive another evaluation at the ER immediately or contact your PCP to discuss your concerns.   - You can call [phone] or [phone] to help schedule an appointment with the appropriate provider.     -If you smoke cigarettes, it would be beneficial for you to stop.    OVER THE COUNTER RECOMMENDATIONS/SUGGESTIONS.     Make sure to stay well hydrated.     Use Nasal Saline to mechanically move any post nasal drip from your eustachian tube or from the back of your throat.     Use warm salt water gargles to ease your throat pain. Warm salt water gargles as needed for sore throat-  1/2 tsp salt to 1 cup warm water, gargle as desired.     Use an antihistamine such as Claritin, Zyrtec or Allegra to dry you out.      Use pseudoephedrine (behind the counter) to decongest. Pseudoephedrine  30 mg up to 240 mg /day. It can raise your blood pressure and give you palpitations.     Use mucinex (guaifenisin) to break up mucous up to 2400mg/day to loosen any mucous.   The mucinex DM pill has a cough suppressant that can be sedating. It can be used at night to stop the tickle at the back of your throat.  You can use Mucinex D (it has guaifenesin and a high dose of pseudoephedrine) in the mornings to help decongest.        Use Afrin in each nare for no longer than 3 days, as it is addictive. It can also dry out your mucous membranes and cause elevated blood pressure. This is especially useful if you  are flying.     Use Flonase 1-2 sprays/nostril per day. It is a local acting steroid nasal spray, if you develop a bloody nose, stop using the medication immediately.     Sometimes Nyquil at night is beneficial to help you get some rest, however it is sedating and it does have an antihistamine, and tylenol.     Honey is a natural cough suppressant that can be used.     Tylenol up to 4,000 mg a day is safe for short periods and can be used for body aches, pain, and fever. However in high doses and prolonged use it can cause liver irritation.     Ibuprofen is a non-steroidal anti-inflammatory that can be used for body aches, pain, and fever.However it can also cause stomach irritation if over used.

## 2023-08-08 PROBLEM — F51.01 PRIMARY INSOMNIA: Status: ACTIVE | Noted: 2023-08-08

## 2024-01-01 DIAGNOSIS — U07.1 COVID-19 VIRUS DETECTED: ICD-10-CM
